# Patient Record
Sex: MALE | Race: WHITE | NOT HISPANIC OR LATINO | URBAN - METROPOLITAN AREA
[De-identification: names, ages, dates, MRNs, and addresses within clinical notes are randomized per-mention and may not be internally consistent; named-entity substitution may affect disease eponyms.]

---

## 2023-04-19 ENCOUNTER — OFFICE VISIT (OUTPATIENT)
Dept: PRIMARY CARE | Facility: CLINIC | Age: 23
End: 2023-04-19
Payer: COMMERCIAL

## 2023-04-19 VITALS
HEART RATE: 87 BPM | OXYGEN SATURATION: 97 % | TEMPERATURE: 97.7 F | SYSTOLIC BLOOD PRESSURE: 108 MMHG | BODY MASS INDEX: 24.82 KG/M2 | RESPIRATION RATE: 18 BRPM | WEIGHT: 173 LBS | DIASTOLIC BLOOD PRESSURE: 60 MMHG

## 2023-04-19 DIAGNOSIS — M67.819 TENDINOSIS OF ROTATOR CUFF: ICD-10-CM

## 2023-04-19 DIAGNOSIS — M25.511 ACUTE PAIN OF RIGHT SHOULDER: ICD-10-CM

## 2023-04-19 DIAGNOSIS — F41.9 ANXIETY: Primary | ICD-10-CM

## 2023-04-19 DIAGNOSIS — F40.10 SOCIAL ANXIETY DISORDER: ICD-10-CM

## 2023-04-19 PROBLEM — K12.1 ORAL ULCER: Status: ACTIVE | Noted: 2023-04-19

## 2023-04-19 PROBLEM — M79.18 MUSCULOSKELETAL PAIN: Status: ACTIVE | Noted: 2023-04-19

## 2023-04-19 PROBLEM — E80.6 HYPERBILIRUBINEMIA: Status: ACTIVE | Noted: 2023-04-19

## 2023-04-19 PROBLEM — K62.89 RECTAL PAIN: Status: ACTIVE | Noted: 2023-04-19

## 2023-04-19 PROBLEM — F32.A ANXIETY AND DEPRESSION: Status: ACTIVE | Noted: 2023-04-19

## 2023-04-19 PROBLEM — R68.82 DECREASED LIBIDO: Status: ACTIVE | Noted: 2023-04-19

## 2023-04-19 PROCEDURE — 99395 PREV VISIT EST AGE 18-39: CPT | Performed by: INTERNAL MEDICINE

## 2023-04-19 RX ORDER — PROPRANOLOL HYDROCHLORIDE 20 MG/1
1 TABLET ORAL DAILY
COMMUNITY
Start: 2022-03-22 | End: 2023-04-19 | Stop reason: SDUPTHER

## 2023-04-19 RX ORDER — PROPRANOLOL HYDROCHLORIDE 20 MG/1
20 TABLET ORAL DAILY
Qty: 90 TABLET | Refills: 3 | Status: SHIPPED | OUTPATIENT
Start: 2023-04-19 | End: 2023-08-30 | Stop reason: SDUPTHER

## 2023-04-19 ASSESSMENT — PAIN SCALES - GENERAL: PAINLEVEL: 0-NO PAIN

## 2023-04-19 ASSESSMENT — PATIENT HEALTH QUESTIONNAIRE - PHQ9
1. LITTLE INTEREST OR PLEASURE IN DOING THINGS: NOT AT ALL
SUM OF ALL RESPONSES TO PHQ9 QUESTIONS 1 AND 2: 0
2. FEELING DOWN, DEPRESSED OR HOPELESS: NOT AT ALL

## 2023-04-20 NOTE — ASSESSMENT & PLAN NOTE
Based on exam there is evidence of any obvious tear however he was given the option of seeing orthopedic service for an injection given that he is currently working out and is not resting it and would probably with continue to separate from this because of that.    However he if he is worse I have asked him not to go to the gym for at least the next 2 to 3 weeks to rest the shoulder ice it and can resume afterwards.

## 2023-04-20 NOTE — PROGRESS NOTES
Subjective   Patient ID: Nagi Heart is a 22 y.o. male who presents for Well Child (Patient states he has runny nose sore throat x 2 weeks.).    HPI patient presents to the office for his annual visit.    Since our last visit has been doing well has had no issues with propranolol and has been very effective for social anxiety    No side effects    On and off is been having some shoulder discomfort which appears to be not limiting him but the discomfort did not seem to go away although he actually works out very regularly it seems that when he is working he feels more than usual    But otherwise no evidence of any significant change in his health    Work is about the same for him he is got a [facility]    No GI symptoms    No abdominal pain no chest pain no shortness of breath    Review of Systems 10 systems reviewed does not mention were negative    Objective   /60 (BP Location: Left arm, Patient Position: Sitting)   Pulse 87   Temp 36.5 °C (97.7 °F)   Resp 18   Wt 78.5 kg (173 lb)   SpO2 97%   BMI 24.82 kg/m²     Physical Exam HEENT normocephalic atraumatic pupils round and reactive no oropharyngeal exudate no thyromegaly  Carotid bruits absent  Cardiovascular regular rate and rhythm no murmurs  Respiratory bilateral breath sounds without rales or rhonchi or mitral chest expansion bilaterally  Abdomen soft nontender bowel sounds are present no organomegaly  Skin warm without any rashes  Musculoskeletal no digital clubbing or cyanosis normal gait no muscle atrophy.   of the hand on the right is normal    His discomfort seems to be more relevant and around may be 120 degrees but can lift his arm above his shoulder pain is slightly worse when he reaches back behind his back and Barr is negative      Neuro alert and oriented Ãƒ-3. Speech clear. Follows commands appropriately  Pulse normal carotid pulse normal radial pulse normal pedal pulses      Assessment/Plan   Problem  List Items Addressed This Visit          Musculoskeletal    Tendinosis of rotator cuff     Based on exam there is evidence of any obvious tear however he was given the option of seeing orthopedic service for an injection given that he is currently working out and is not resting it and would probably with continue to separate from this because of that.    However he if he is worse I have asked him not to go to the gym for at least the next 2 to 3 weeks to rest the shoulder ice it and can resume afterwards.            Other    Anxiety - Primary    Relevant Medications    propranolol (Inderal) 20 mg tablet    Social anxiety disorder     Propranolol has been very effective for him at the current daily dose and he has had no side effects to talk about    Like to continue this current dose which we have agreed to do so.              Other Visit Diagnoses       Acute pain of right shoulder        Relevant Orders    Referral to Orthopaedic Surgery          Otherwise overall he is up-to-date with his vaccinations    He does not require any further assessment in terms of blood work at this point we will see him back in about a year

## 2023-04-20 NOTE — ASSESSMENT & PLAN NOTE
Propranolol has been very effective for him at the current daily dose and he has had no side effects to talk about    Like to continue this current dose which we have agreed to do so.

## 2023-08-30 DIAGNOSIS — F41.9 ANXIETY: ICD-10-CM

## 2023-08-30 RX ORDER — PROPRANOLOL HYDROCHLORIDE 20 MG/1
20 TABLET ORAL DAILY
Qty: 90 TABLET | Refills: 3 | Status: SHIPPED | OUTPATIENT
Start: 2023-08-30 | End: 2024-05-14 | Stop reason: SDUPTHER

## 2023-08-30 NOTE — TELEPHONE ENCOUNTER
Rx Refill Request Telephone Encounter    Name:  Nagi Heart  :  [id number]  Medication Name:      PATIENT IS LOOKING FOR A MALE DR SO HE SAID HE WILL CHECK THE LETTER HE GOT ABOUT  LEAVING. BUT PATIENT IS REQUESTING REFILL IN THE MEANTIME UNTIL HE GETS ANOTHER PCP    PROPRANOLOL 20 MG          Specific Pharmacy location:  [facility]  Date of last appointment:  23  Date of next appointment:    Best number to reach patient:

## 2023-10-17 ENCOUNTER — APPOINTMENT (OUTPATIENT)
Dept: PRIMARY CARE | Facility: CLINIC | Age: 23
End: 2023-10-17
Payer: COMMERCIAL

## 2024-05-14 ENCOUNTER — OFFICE VISIT (OUTPATIENT)
Dept: PRIMARY CARE | Facility: CLINIC | Age: 24
End: 2024-05-14
Payer: COMMERCIAL

## 2024-05-14 VITALS
BODY MASS INDEX: 25.05 KG/M2 | OXYGEN SATURATION: 98 % | DIASTOLIC BLOOD PRESSURE: 74 MMHG | WEIGHT: 175 LBS | HEIGHT: 70 IN | SYSTOLIC BLOOD PRESSURE: 109 MMHG | RESPIRATION RATE: 18 BRPM | HEART RATE: 81 BPM | TEMPERATURE: 97.9 F

## 2024-05-14 DIAGNOSIS — F40.10 SOCIAL ANXIETY DISORDER: ICD-10-CM

## 2024-05-14 DIAGNOSIS — F41.9 ANXIETY: Primary | ICD-10-CM

## 2024-05-14 DIAGNOSIS — M75.21 BICEPS TENDINITIS OF RIGHT UPPER EXTREMITY: ICD-10-CM

## 2024-05-14 PROCEDURE — 99213 OFFICE O/P EST LOW 20 MIN: CPT | Performed by: FAMILY MEDICINE

## 2024-05-14 PROCEDURE — 1036F TOBACCO NON-USER: CPT | Performed by: FAMILY MEDICINE

## 2024-05-14 RX ORDER — PROPRANOLOL HYDROCHLORIDE 20 MG/1
20 TABLET ORAL DAILY
Qty: 90 TABLET | Refills: 1 | Status: SHIPPED | OUTPATIENT
Start: 2024-05-14

## 2024-05-14 ASSESSMENT — ANXIETY QUESTIONNAIRES
GAD7 TOTAL SCORE: 6
5. BEING SO RESTLESS THAT IT IS HARD TO SIT STILL: SEVERAL DAYS
3. WORRYING TOO MUCH ABOUT DIFFERENT THINGS: SEVERAL DAYS
1. FEELING NERVOUS, ANXIOUS, OR ON EDGE: SEVERAL DAYS
IF YOU CHECKED OFF ANY PROBLEMS ON THIS QUESTIONNAIRE, HOW DIFFICULT HAVE THESE PROBLEMS MADE IT FOR YOU TO DO YOUR WORK, TAKE CARE OF THINGS AT HOME, OR GET ALONG WITH OTHER PEOPLE: NOT DIFFICULT AT ALL
7. FEELING AFRAID AS IF SOMETHING AWFUL MIGHT HAPPEN: NOT AT ALL
6. BECOMING EASILY ANNOYED OR IRRITABLE: MORE THAN HALF THE DAYS
2. NOT BEING ABLE TO STOP OR CONTROL WORRYING: NOT AT ALL
4. TROUBLE RELAXING: SEVERAL DAYS

## 2024-05-14 ASSESSMENT — ENCOUNTER SYMPTOMS
HEADACHES: 0
DIZZINESS: 0
SHORTNESS OF BREATH: 0

## 2024-05-14 NOTE — PROGRESS NOTES
Subjective     Nagi Heart is a 23 y.o. male who presents for Anxiety.    Anxiety  Patient reports no chest pain, dizziness or shortness of breath.          Pt is new to practice.  He is here to establish care.  He has hx of situational anxiety/performance anxiety.  He takes propranolol 20 mg at bedtime on weeknights.  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  He feels the propranolol helps keep him calm during the day when he is working.  He wants to continue taking the propranolol nightly for now.  He is aware that he an also take it as needed prior to a stressful event.  Patient denies chest pain, shortness of breath, dizziness, headaches or vision changes, syncope.  No depression symptoms.  Patient denies any suicidal or homicidal ideation or plan.       Pt went to [facility] ER on 5/6/24 for concussion symptoms and forehead abrasion after hitting his head on the basketball hoop pole.  No prior hx of concussions.  He was feeling very tired the next day and having head pain, dizziness so he went to ER.  He had a CT brain which showed no acute intracranial abnormality. He declined tdap at ER.  He had his forehead abrasion cleaned at ER.  He was prescribed anti nausea medication (reglan) and acetaminophen.  He reports that overall he is feeling better.  His headaches and fatigue has improved.  No dizziness anymore.  He declines referral to concussion clinic.  No nausea or vomiting.  Mild light sensitivity which has improved as well.     Pt also reports Right distal biceps pain - acute - started six days ago.  Pain was noted a few sets into bicep curls , pain persisted - intermittent pain. Pt tried OTC NSAIDs as needed.       Review of Systems   Respiratory:  Negative for shortness of breath.    Cardiovascular:  Negative for chest pain.   Neurological:  Negative for dizziness and headaches.       Objective     Vitals:    05/14/24 1304   BP: 109/74   BP Location: Left arm   Patient Position: Sitting   Pulse:  "81   Resp: 18   Temp: 36.6 °C (97.9 °F)   TempSrc: Temporal   SpO2: 98%   Weight: 79.4 kg (175 lb)   Height: 1.778 m (5' 10\")        Current Outpatient Medications   Medication Instructions    propranolol (INDERAL) 20 mg, oral, Daily        No Known Allergies     History reviewed. No pertinent surgical history.     Social History     Tobacco Use    Smoking status: Never    Smokeless tobacco: Never   Vaping Use    Vaping status: Never Used        Social History     Substance and Sexual Activity   Alcohol Use None       No family history on file.     Immunization History   Administered Date(s) Administered    Moderna SARS-CoV-2 Vaccination 08/16/2021, 10/07/2021        Physical Exam  Vitals reviewed.   Constitutional:       General: He is not in acute distress.     Appearance: Normal appearance. He is well-developed.   HENT:      Head: Normocephalic and atraumatic.        Comments: Small abrasion noted on upper center forehead, healing well, no sign of infection.   Eyes:      General: Lids are normal.      Conjunctiva/sclera:      Right eye: Right conjunctiva is not injected.      Left eye: Left conjunctiva is not injected.   Cardiovascular:      Rate and Rhythm: Normal rate and regular rhythm.      Heart sounds: No murmur heard.  Pulmonary:      Effort: Pulmonary effort is normal. No respiratory distress.      Breath sounds: Normal breath sounds. No wheezing, rhonchi or rales.   Musculoskeletal:        Arms:       Comments: Right distal biceps tendon ttp.  No swelling.  No sign of biceps rupture.  No biceps deformity noted on exam.  Good strength bilaterally with biceps flexion.  No bruising note.     Proximal biceps , no ttp.  No swelling. No bruising noted.     Skin:     General: Skin is warm and dry.      Findings: No rash.   Neurological:      Mental Status: He is alert and oriented to person, place, and time. Mental status is at baseline.      Cranial Nerves: Cranial nerves 2-12 are intact.   Psychiatric:        "  Mood and Affect: Mood normal.         Behavior: Behavior normal.         Problem List Items Addressed This Visit       Anxiety - Primary    Relevant Medications    propranolol (Inderal) 20 mg tablet    Social anxiety disorder     Other Visit Diagnoses       Biceps tendinitis of right upper extremity        Relevant Orders    Referral to Sports Medicine    Referral to Physical Therapy            Assessment/Plan     New patient     Anxiety - well controlled on propranolol 20 mg at bedtime - continue.  Refilled today .    Concussion symptoms - s/p hitting head against basketball hoop pole on 5/6/24, s/p ER visit, negative CT head.  Offered concussion clinic referral, pt declined.  He is feeling better. Advise mental rest, avoid screens, stay well hydrated.      Right distal biceps pain - acute -  Possible distal right biceps tendonitis, advise Ibuprofen 600 mg TID x 5 days, ice to area,  avoid biceps exercises until fully healed.  if symptoms persist will need to see sports medicine, physical therapy.  Pt agreeable. Referrals placed.     Follow up in 2-4 weeks or sooner if needed

## 2024-08-06 ENCOUNTER — APPOINTMENT (OUTPATIENT)
Dept: PRIMARY CARE | Facility: CLINIC | Age: 24
End: 2024-08-06
Payer: COMMERCIAL

## 2024-09-25 ENCOUNTER — APPOINTMENT (OUTPATIENT)
Dept: PRIMARY CARE | Facility: CLINIC | Age: 24
End: 2024-09-25
Payer: COMMERCIAL

## 2024-12-03 ENCOUNTER — APPOINTMENT (OUTPATIENT)
Dept: PRIMARY CARE | Facility: CLINIC | Age: 24
End: 2024-12-03
Payer: COMMERCIAL

## 2024-12-11 ENCOUNTER — OFFICE VISIT (OUTPATIENT)
Dept: PRIMARY CARE | Facility: CLINIC | Age: 24
End: 2024-12-11
Payer: COMMERCIAL

## 2024-12-11 VITALS
DIASTOLIC BLOOD PRESSURE: 62 MMHG | RESPIRATION RATE: 16 BRPM | SYSTOLIC BLOOD PRESSURE: 108 MMHG | OXYGEN SATURATION: 99 % | BODY MASS INDEX: 25.05 KG/M2 | HEART RATE: 81 BPM | WEIGHT: 175 LBS | HEIGHT: 70 IN | TEMPERATURE: 97.8 F

## 2024-12-11 DIAGNOSIS — Z00.00 ROUTINE GENERAL MEDICAL EXAMINATION AT A HEALTH CARE FACILITY: Primary | ICD-10-CM

## 2024-12-11 DIAGNOSIS — H61.23 BILATERAL IMPACTED CERUMEN: ICD-10-CM

## 2024-12-11 DIAGNOSIS — E55.9 VITAMIN D DEFICIENCY: ICD-10-CM

## 2024-12-11 LAB
ALBUMIN SERPL BCP-MCNC: 4.5 G/DL (ref 3.4–5)
ALP SERPL-CCNC: 59 U/L (ref 33–120)
ALT SERPL W P-5'-P-CCNC: 27 U/L (ref 10–52)
ANION GAP SERPL CALCULATED.3IONS-SCNC: 13 MMOL/L (ref 10–20)
AST SERPL W P-5'-P-CCNC: 25 U/L (ref 9–39)
BASOPHILS # BLD AUTO: 0.04 X10*3/UL (ref 0–0.1)
BASOPHILS NFR BLD AUTO: 0.7 %
BILIRUB SERPL-MCNC: 1.3 MG/DL (ref 0–1.2)
BUN SERPL-MCNC: 14 MG/DL (ref 6–23)
CALCIUM SERPL-MCNC: 9.1 MG/DL (ref 8.6–10.3)
CHLORIDE SERPL-SCNC: 103 MMOL/L (ref 98–107)
CHOLEST SERPL-MCNC: 113 MG/DL (ref 0–199)
CHOLEST/HDLC SERPL: 3.4 {RATIO}
CO2 SERPL-SCNC: 28 MMOL/L (ref 21–32)
CREAT SERPL-MCNC: 1 MG/DL (ref 0.5–1.3)
EGFRCR SERPLBLD CKD-EPI 2021: >90 ML/MIN/1.73M*2
EOSINOPHIL # BLD AUTO: 0.15 X10*3/UL (ref 0–0.7)
EOSINOPHIL NFR BLD AUTO: 2.6 %
ERYTHROCYTE [DISTWIDTH] IN BLOOD BY AUTOMATED COUNT: 11.9 % (ref 11.5–14.5)
GLUCOSE SERPL-MCNC: 84 MG/DL (ref 74–99)
HCT VFR BLD AUTO: 43.2 % (ref 41–52)
HDLC SERPL-MCNC: 32.8 MG/DL
HGB BLD-MCNC: 15.3 G/DL (ref 13.5–17.5)
IMM GRANULOCYTES # BLD AUTO: 0.01 X10*3/UL (ref 0–0.7)
IMM GRANULOCYTES NFR BLD AUTO: 0.2 % (ref 0–0.9)
LDLC SERPL CALC-MCNC: 59 MG/DL
LYMPHOCYTES # BLD AUTO: 1.55 X10*3/UL (ref 1.2–4.8)
LYMPHOCYTES NFR BLD AUTO: 27.1 %
MCH RBC QN AUTO: 33 PG (ref 26–34)
MCHC RBC AUTO-ENTMCNC: 35.4 G/DL (ref 32–36)
MCV RBC AUTO: 93 FL (ref 80–100)
MONOCYTES # BLD AUTO: 0.77 X10*3/UL (ref 0.1–1)
MONOCYTES NFR BLD AUTO: 13.5 %
NEUTROPHILS # BLD AUTO: 3.19 X10*3/UL (ref 1.2–7.7)
NEUTROPHILS NFR BLD AUTO: 55.9 %
NON HDL CHOLESTEROL: 80 MG/DL (ref 0–149)
NRBC BLD-RTO: 0 /100 WBCS (ref 0–0)
PLATELET # BLD AUTO: 216 X10*3/UL (ref 150–450)
POTASSIUM SERPL-SCNC: 4 MMOL/L (ref 3.5–5.3)
PROT SERPL-MCNC: 6.8 G/DL (ref 6.4–8.2)
RBC # BLD AUTO: 4.64 X10*6/UL (ref 4.5–5.9)
SODIUM SERPL-SCNC: 140 MMOL/L (ref 136–145)
TRIGL SERPL-MCNC: 105 MG/DL (ref 0–149)
TSH SERPL-ACNC: 1.88 MIU/L (ref 0.44–3.98)
VLDL: 21 MG/DL (ref 0–40)
WBC # BLD AUTO: 5.7 X10*3/UL (ref 4.4–11.3)

## 2024-12-11 PROCEDURE — 1036F TOBACCO NON-USER: CPT | Performed by: FAMILY MEDICINE

## 2024-12-11 PROCEDURE — 3008F BODY MASS INDEX DOCD: CPT | Performed by: FAMILY MEDICINE

## 2024-12-11 PROCEDURE — 80053 COMPREHEN METABOLIC PANEL: CPT | Performed by: FAMILY MEDICINE

## 2024-12-11 PROCEDURE — 80061 LIPID PANEL: CPT | Performed by: FAMILY MEDICINE

## 2024-12-11 PROCEDURE — 84443 ASSAY THYROID STIM HORMONE: CPT | Performed by: FAMILY MEDICINE

## 2024-12-11 PROCEDURE — 82306 VITAMIN D 25 HYDROXY: CPT | Performed by: FAMILY MEDICINE

## 2024-12-11 PROCEDURE — 99385 PREV VISIT NEW AGE 18-39: CPT | Performed by: FAMILY MEDICINE

## 2024-12-11 PROCEDURE — 69209 REMOVE IMPACTED EAR WAX UNI: CPT | Performed by: FAMILY MEDICINE

## 2024-12-11 PROCEDURE — 85025 COMPLETE CBC W/AUTO DIFF WBC: CPT | Performed by: FAMILY MEDICINE

## 2024-12-11 ASSESSMENT — ENCOUNTER SYMPTOMS
RESPIRATORY NEGATIVE: 1
LOSS OF SENSATION IN FEET: 0
CARDIOVASCULAR NEGATIVE: 1
OCCASIONAL FEELINGS OF UNSTEADINESS: 0
MUSCULOSKELETAL NEGATIVE: 1
DEPRESSION: 0
CONSTITUTIONAL NEGATIVE: 1
GASTROINTESTINAL NEGATIVE: 1
NEUROLOGICAL NEGATIVE: 1

## 2024-12-11 ASSESSMENT — PATIENT HEALTH QUESTIONNAIRE - PHQ9
1. LITTLE INTEREST OR PLEASURE IN DOING THINGS: NOT AT ALL
2. FEELING DOWN, DEPRESSED OR HOPELESS: NOT AT ALL
SUM OF ALL RESPONSES TO PHQ9 QUESTIONS 1 AND 2: 0

## 2024-12-11 ASSESSMENT — COLUMBIA-SUICIDE SEVERITY RATING SCALE - C-SSRS
2. HAVE YOU ACTUALLY HAD ANY THOUGHTS OF KILLING YOURSELF?: NO
1. IN THE PAST MONTH, HAVE YOU WISHED YOU WERE DEAD OR WISHED YOU COULD GO TO SLEEP AND NOT WAKE UP?: NO
6. HAVE YOU EVER DONE ANYTHING, STARTED TO DO ANYTHING, OR PREPARED TO DO ANYTHING TO END YOUR LIFE?: NO

## 2024-12-11 ASSESSMENT — PAIN SCALES - GENERAL: PAINLEVEL_OUTOF10: 0-NO PAIN

## 2024-12-11 NOTE — PROGRESS NOTES
"Subjective   Patient ID: Nagi Heart is a 24 y.o. male who presents for New Patient Visit (Pt is here to establish care and fbw.).    HPI   He takes b blockers prn for social anxiety.    Review of Systems   Constitutional: Negative.    HENT: Negative.     Respiratory: Negative.     Cardiovascular: Negative.    Gastrointestinal: Negative.    Genitourinary: Negative.    Musculoskeletal: Negative.    Neurological: Negative.        Objective   /62 (BP Location: Left arm, Patient Position: Sitting, BP Cuff Size: Adult)   Pulse 81   Temp 36.6 °C (97.8 °F) (Temporal)   Resp 16   Ht 1.778 m (5' 10\")   Wt 79.4 kg (175 lb)   SpO2 99%   BMI 25.11 kg/m²     Physical Exam  Vitals and nursing note reviewed.   Constitutional:       General: He is not in acute distress.  HENT:      Right Ear: Tympanic membrane normal. There is impacted cerumen.      Left Ear: Tympanic membrane normal. There is impacted cerumen.      Nose: Nose normal. No rhinorrhea.      Mouth/Throat:      Pharynx: Oropharynx is clear. No oropharyngeal exudate or posterior oropharyngeal erythema.      Comments: Dentition wnl  Eyes:      Extraocular Movements: Extraocular movements intact.      Conjunctiva/sclera: Conjunctivae normal.      Pupils: Pupils are equal, round, and reactive to light.   Neck:      Vascular: No carotid bruit.   Cardiovascular:      Rate and Rhythm: Normal rate and regular rhythm.      Heart sounds: Normal heart sounds. No murmur heard.  Pulmonary:      Breath sounds: Normal breath sounds. No wheezing or rhonchi.   Abdominal:      General: Bowel sounds are normal. There is no distension.      Palpations: Abdomen is soft. There is no mass.      Tenderness: There is no abdominal tenderness. There is no guarding or rebound.      Hernia: No hernia is present.   Musculoskeletal:         General: No swelling or tenderness. Normal range of motion.      Cervical back: Normal range of motion and neck supple.   Lymphadenopathy:      " Cervical: No cervical adenopathy.   Skin:     General: Skin is warm.      Findings: No rash.   Neurological:      General: No focal deficit present.      Mental Status: He is alert.     Patient ID: Nagi Heart is a 24 y.o. male.    Ear Cerumen Removal    Date/Time: 12/11/2024 9:43 AM    Performed by: Marco A Birmingham DO  Authorized by: Marco A Birmingham DO    Consent:     Consent obtained:  Verbal    Consent given by:  Patient    Risks, benefits, and alternatives were discussed: yes    Universal protocol:     Patient identity confirmed:  Verbally with patient  Procedure details:     Location:  L ear and R ear    Procedure type: irrigation      Procedure outcomes: cerumen removed    Post-procedure details:     Inspection:  Ear canal clear    Hearing quality:  Normal    Procedure completion:  Tolerated      Assessment/Plan   Problem List Items Addressed This Visit    None  Visit Diagnoses         Codes    Routine general medical examination at a health care facility    -  Primary Z00.00    Relevant Orders    CBC and Auto Differential    Comprehensive Metabolic Panel    TSH with reflex to Free T4 if abnormal    Lipid Panel    Vitamin D deficiency     E55.9    Relevant Orders    Vitamin D 25-Hydroxy,Total (for eval of Vitamin D levels)    Bilateral impacted cerumen     H61.23

## 2024-12-12 LAB — 25(OH)D3 SERPL-MCNC: 55 NG/ML (ref 30–100)

## 2025-01-16 DIAGNOSIS — F41.9 ANXIETY: ICD-10-CM

## 2025-01-16 RX ORDER — PROPRANOLOL HYDROCHLORIDE 20 MG/1
20 TABLET ORAL DAILY
Qty: 90 TABLET | Refills: 1 | Status: SHIPPED | OUTPATIENT
Start: 2025-01-16

## 2025-01-29 ENCOUNTER — APPOINTMENT (OUTPATIENT)
Dept: DERMATOLOGY | Facility: CLINIC | Age: 25
End: 2025-01-29
Payer: MEDICAID

## 2025-01-29 DIAGNOSIS — L64.9 ANDROGENETIC ALOPECIA: Primary | ICD-10-CM

## 2025-01-29 PROCEDURE — 99203 OFFICE O/P NEW LOW 30 MIN: CPT

## 2025-01-29 RX ORDER — FINASTERIDE 1 MG/1
TABLET, FILM COATED ORAL
Qty: 30 TABLET | Refills: 5 | Status: SHIPPED | OUTPATIENT
Start: 2025-01-29

## 2025-01-29 NOTE — PROGRESS NOTES
Subjective     Nagi Heart is a 24 y.o. male who presents for the following: Alopecia.   Patient is here today for hair thinning and shedding. He reports having this problem when he was in college and was started on Finasteride. He took the medication for about a year to a year and a half and had good results. He started noticing an increase in hair thinning and shedding about 2 months ago. Reports a family history of hair loss with his paternal grandfather. When asked about recent stressors, he reports being admitted to the hospital at the end of 2024 because of problems with his liver- says he noticed the increase in hair shedding before this. He also recently got a new job. He works from home and feels his new job is less stressful than his previous job.     Review of Systems:  No other skin or systemic complaints other than what is documented elsewhere in the note.    The following portions of the chart were reviewed this encounter and updated as appropriate:   Allergies  Meds         Skin Cancer History  No skin cancer on file.      Specialty Problems    None       Objective   Well appearing patient in no apparent distress; mood and affect are within normal limits.    A focused skin examination was performed. All findings within normal limits unless otherwise noted below.    Assessment/Plan   1. Androgenetic alopecia  Scalp  Decreased hair density in androgenetic areas.     -Discussed the mechanism of action of androgenetic alopecia, including the slowly progressive nature of the condition and the need for any therapy to be continued long term to maintain results.  -Recommend finasteride 1 mg daily by mouth. Discussed information on the potential adverse effects including, but not limited to, erectile dysfunction, decreased libido, and depression.     -Follow up in 6 months.     finasteride (Propecia) 1 mg tablet - Scalp  Take one pill by mouth. Do not crush, chew, or split.

## 2025-07-13 DIAGNOSIS — F41.9 ANXIETY: ICD-10-CM

## 2025-07-14 RX ORDER — PROPRANOLOL HYDROCHLORIDE 20 MG/1
20 TABLET ORAL DAILY
Qty: 90 TABLET | Refills: 1 | Status: SHIPPED | OUTPATIENT
Start: 2025-07-14

## 2025-07-25 ENCOUNTER — APPOINTMENT (OUTPATIENT)
Dept: DERMATOLOGY | Facility: CLINIC | Age: 25
End: 2025-07-25
Payer: COMMERCIAL

## 2025-08-01 ENCOUNTER — APPOINTMENT (OUTPATIENT)
Dept: DERMATOLOGY | Facility: CLINIC | Age: 25
End: 2025-08-01
Payer: COMMERCIAL

## 2025-08-01 DIAGNOSIS — L64.9 ANDROGENETIC ALOPECIA: Primary | ICD-10-CM

## 2025-08-01 PROCEDURE — 99213 OFFICE O/P EST LOW 20 MIN: CPT

## 2025-08-01 PROCEDURE — 1036F TOBACCO NON-USER: CPT

## 2025-08-01 RX ORDER — FINASTERIDE 1 MG/1
TABLET, FILM COATED ORAL
Qty: 30 TABLET | Refills: 11 | Status: SHIPPED | OUTPATIENT
Start: 2025-08-01

## 2025-08-01 NOTE — Clinical Note
Patient is a 25 y.o. male here for AGA follow up. Currently taking finasteride 1 mg daily and tolerating well. Reports good hair growth and is happy with the current treatment plan.     -Discussed the mechanism of action of androgenetic alopecia, including the slowly progressive nature of the condition and the need for any therapy to be continued long term to maintain results.  -Discussed available therapies and anticipated efficacy, including topical and/or oral treatments.   Recommend:  -Continue finasteride 1 mg by mouth daily. Discussed the potential adverse effects including, but not limited to, erectile dysfunction, decreased libido, and depression. There is a rare possibility that these side effects may be irreversible and permanent.     -Refills sent to pharmacy for 1 year. Follow up in 1 year or sooner if needed.

## 2025-08-01 NOTE — PROGRESS NOTES
Subjective     Nagi Heart is a 25 y.o. male who presents for the following: Alopecia.          Review of Systems:  No other skin or systemic complaints other than what is documented elsewhere in the note.    The following portions of the chart were reviewed this encounter and updated as appropriate:   Tobacco  Allergies  Meds  Problems  Med Hx  Surg Hx  Fam Hx         Skin Cancer History  Biopsy Log Book  No skin cancers from Specimen Tracking.    Additional History      Specialty Problems    None       Objective   Well appearing patient in no apparent distress; mood and affect are within normal limits.    A focused skin examination was performed. All findings within normal limits unless otherwise noted below.    Assessment/Plan   Skin Exam  1. ANDROGENETIC ALOPECIA  Scalp  Decreased hair density in bitemporal scalp.   Patient is a 25 y.o. male here for AGA follow up. Currently taking finasteride 1 mg daily and tolerating well. Reports good hair growth and is happy with the current treatment plan.     -Discussed the mechanism of action of androgenetic alopecia, including the slowly progressive nature of the condition and the need for any therapy to be continued long term to maintain results.  -Discussed available therapies and anticipated efficacy, including topical and/or oral treatments.   Recommend:  -Continue finasteride 1 mg by mouth daily. Discussed the potential adverse effects including, but not limited to, erectile dysfunction, decreased libido, and depression. There is a rare possibility that these side effects may be irreversible and permanent.     -Refills sent to pharmacy for 1 year. Follow up in 1 year or sooner if needed.   This Visit  - finasteride (Propecia) 1 mg tablet - Take one pill by mouth. Do not crush, chew, or split.

## 2025-08-02 DIAGNOSIS — L64.9 ANDROGENETIC ALOPECIA: ICD-10-CM

## 2025-08-04 RX ORDER — FINASTERIDE 1 MG/1
1 TABLET, FILM COATED ORAL
Qty: 90 TABLET | Refills: 1 | Status: SHIPPED | OUTPATIENT
Start: 2025-08-04